# Patient Record
Sex: MALE | Race: WHITE
[De-identification: names, ages, dates, MRNs, and addresses within clinical notes are randomized per-mention and may not be internally consistent; named-entity substitution may affect disease eponyms.]

---

## 2018-03-12 ENCOUNTER — HOSPITAL ENCOUNTER (EMERGENCY)
Dept: HOSPITAL 26 - MED | Age: 28
Discharge: HOME | End: 2018-03-12
Payer: MEDICAID

## 2018-03-12 VITALS — DIASTOLIC BLOOD PRESSURE: 75 MMHG | SYSTOLIC BLOOD PRESSURE: 124 MMHG

## 2018-03-12 VITALS — BODY MASS INDEX: 21.09 KG/M2 | HEIGHT: 69 IN | WEIGHT: 142.38 LBS

## 2018-03-12 VITALS — SYSTOLIC BLOOD PRESSURE: 116 MMHG | DIASTOLIC BLOOD PRESSURE: 59 MMHG

## 2018-03-12 DIAGNOSIS — J45.909: ICD-10-CM

## 2018-03-12 DIAGNOSIS — K64.9: Primary | ICD-10-CM

## 2018-03-12 NOTE — NUR
27/M CAME IN W C/O RECTAL BLEEDING X 1 DAY. PT REPORTS BRIGHT RED/DARK RED 
BLOOD IN STOOLS TODAY. PT STATES HE HAD SIMILAR SX X2 YEARS AGO, PT STATES " 
THEY TOLD ME TO SEE A SPECIALIST, BUT I NEVER DID". REPORTS RECTAL PAIN DURING 
BM, DENIES CONSTIPATION. PMH: ASHTMA, ANXIETY

## 2018-03-12 NOTE — NUR
Patient discharged with v/s stable. Written and verbal after care instructions 
given and explained. 

Patient alert, oriented and verbalized understanding of instructions. 
Ambulatory with steady gait. All questions addressed prior to discharge. ID 
band removed. Patient advised to follow up with PMD. Rx of COLACE AND MOTRIN 
given. Patient educated on indication of medication including possible reaction 
and side effects. Opportunity to ask questions provided and answered.